# Patient Record
Sex: FEMALE | Race: WHITE | NOT HISPANIC OR LATINO | URBAN - METROPOLITAN AREA
[De-identification: names, ages, dates, MRNs, and addresses within clinical notes are randomized per-mention and may not be internally consistent; named-entity substitution may affect disease eponyms.]

---

## 2017-02-15 ENCOUNTER — OFFICE VISIT - RIVER FALLS (OUTPATIENT)
Dept: FAMILY MEDICINE | Facility: CLINIC | Age: 42
End: 2017-02-15

## 2017-02-15 ASSESSMENT — MIFFLIN-ST. JEOR: SCORE: 1230.09

## 2022-02-12 VITALS
HEART RATE: 80 BPM | HEIGHT: 65 IN | WEIGHT: 131 LBS | OXYGEN SATURATION: 99 % | BODY MASS INDEX: 21.83 KG/M2 | DIASTOLIC BLOOD PRESSURE: 68 MMHG | TEMPERATURE: 97.9 F | SYSTOLIC BLOOD PRESSURE: 118 MMHG

## 2022-02-15 NOTE — PROGRESS NOTES
Patient:   MAXIMINO PASCAL            MRN: [id number]            FIN: [id number]               Age:   41 years     Sex:  Female     :  1975   Associated Diagnoses:   Conjunctivitis; Other impetigo   Author:   Wilmar Bravo MD      Visit Information      Date of Service: 02/15/2017 03:25 pm  [facility]  Encounter#: [id number]      Chief Complaint   2/15/2017 3:29 PM CST    Pt presents today for itchy eye and skin irritation. Friday night she started to feel grainy feeling in her eyes. There is flakey skin in the crease of her right eye lid. Skin irritation has been present x 2 weeks. Dry cough x 2 days. H/O of pneumonia        History of Present Illness   Pt presents today for itchy pink eye irritation and bright red itchy skin irritation. Friday night she started to feel grainy feeling in her eyes. There is flakey skin in the crease of her right eye lid. Skin irritation has been present x 2 weeks. She was using her [employer] eye drops he had to treat his pink eye x 3 days. They seemed to help her skin irritation initially. Today her eyes do not feel right, they are itchy. She has a h/o rashes. The rash was initially flakey and white crusty then after the eye drops the flakiness fell off. Patient denies breaks in the skin prior to the rash appearing. Her right eye lid was irritated before she noticed the skin rash. She has deep painful acne located on her face that presented 2 weeks ago. Her eyes have been more watery recently. When she wakes in the AM her eye are not crusted shut. Denies any allergy issues or exposures. Denies recent eye drainage.    Dry cough x 2 days. H/O of pneumonia. She works for [employer] [place] [employer].      Review of Systems   Constitutional >> no fever, no chills  Eye >> no recent vision problems, no lcterus, no discharge, no blindness, no blurry or double vision  ENMT >> no decrease in hearing, no dysphasia, no ear pain, no sore throat, no  nasal congestion  Respiratory >> no SOB, dry cough, no sputum production, no wheezing, no labored respirations  Cardiovascular >> no chest or calf pain, no palpitations, no bradycardia or tachycardia, no edema  Gastrointestinal >> no nausea or vomiting, no diarrhea or constipation, no abdominal pain  Genitourinary >> negative  Hema/Lymph >> negative  Endocrine >> negative  Immunologic >> negative  Musculoskeletal >> no back pain, no neck pain, no joint pain, no muscle pain  Integumentary >> rash, dryness, deep firm nodule of right temple (epidermoid cyst), no abrasions, no pruritus, no petechiae  Neurologic >> patient is alert and orientated x 4, no headache, no loss of balance, no dizziness  Psychiatric >> no anxiety, no depression, no [place], no hallucinations, no suicidal ideation  All Other ROS >> ROS reviewed as documented in chart and HPI      Health Status   Allergies:    Allergic Reactions (Selected)  Severity Not Documented  Zithromax (Blisters)   Medications:  (Selected)   Prescriptions  Prescribed  cephalexin 500 mg oral capsule: 1 cap(s) ( 500 mg ), po, tid, # 21 cap(s), 0 Refill(s), Type: Maintenance, Pharmacy: [id number], 1 cap(s) po tid,x7 day(s)  sulfacetamide sodium 10% ophthalmic solution: 1 drop(s), both eyes, q6 hrs, # 15 mL, 0 Refill(s), Type: Maintenance, Pharmacy: [id number], 1 drop(s) both eyes q6 hrs,x7 day(s)  Documented Medications  Documented  Multiple Vitamins oral tablet: 1 tab(s), po, daily, 0 Refill(s), Type: Maintenance   Problem list:    All Problems  Recurrent cold sores / SNOMED CT 8139984639 / Confirmed  Seasonal Rhinitis / ICD-9-.9 / Confirmed      Histories   Past Medical History:    Active  Seasonal Rhinitis (477.9)  Resolved  PNA (pneumonia) (486):  Resolved in 2006 at 31 years.  Pregnancy (949038059):  Resolved on 9/24/2003 at 28 years.  Pregnancy (129707463):  Resolved on 6/13/2007 at 32 years.   Family History:    CA - Breast  cancer  Mother  Diabetes mellitus  Grandfather (M)  Heart disease  Sister  MI - Myocardial infarction  Father ()     Procedure history:    Spinal cord tumor (SNOMED CT [id number]) on 3/2/2016 at 40 Years.  Comments:  2016 12:40 PM - Leda Kohler  T12 and L1.  Mammogram (SNOMED CT 860090655) on 2015 at 40 Years.   Social History:        Alcohol Assessment            Current                     Comments:                      2010 - Parris Fisher CMA                     Occasionally.      Tobacco Assessment            Never      Substance Abuse Assessment            Never      Employment and Education Assessment            Employed                     Comments:                      2013 - [employer]                           Home and Environment Assessment            Marital status: .  Spouse/Partner name: Angel Navarro.      Nutrition and Health Assessment            Type of diet: Regular.      Exercise and Physical Activity Assessment            Exercise frequency: 1-2 times/week.  Exercise type: Walking.      Sexual Assessment            Sexually active: Yes.  Sexual orientation: Heterosexual.        Physical Examination   Vital Signs   2/15/2017 3:29 PM CST Temperature Tympanic 97.9 DegF    Peripheral Pulse Rate 80 bpm    HR Method Electronic    Systolic Blood Pressure 118 mmHg    Diastolic Blood Pressure 68 mmHg    Mean Arterial Pressure 85 mmHg    BP Site Right arm    BP Method Manual    Oxygen Saturation 99 %      Measurements from flowsheet : Measurements   2/15/2017 3:29 PM CST Height Measured 65 in    Weight Measured 131 lb    BSA 1.65 m2    Body Mass Index 21.8 kg/m2      General >> alert and orientated, no acute distress  Eye >> bilateral pink conjunctiva, crusting rash right upper lid and left lower lid  HEENT >> normal hearing  Neck >> supple, non-tender  Respiratory >> non-labored respirations, lung CTA, BS  equal  Cardiovascular >> normal rate, regular rhythm  Gastrointestinal >> negative  Genitourinary >> negative  Lymphatics >> negative  Musculoskeletal >> no body aches  Integumentary >> warm, dry, no rash  Neurologic >> alert, orientated  Psychiatric >> cooperative, appropriate mood and affect      Impression and Plan   Diagnosis     Conjunctivitis (DGW03-NQ H10.33).     Other impetigo (FCR83-JS L01.09).     Lesion on face could be a epidermoid cyst. Advised to leave lesion alone and watch it. If does not get better or if it becomes bothersome then follow up.  Break in skin and bacterial infection. Impetigo mainly caused by staph skin infection causing flakey skin.  Oral antibiotic - cephalexin 500 mg TID x 7 days discussed and prescribed for skin irritation.  Eyes are a little pink.  Sulfacetamide eye drops to apply TID x 5-7 days discussed and prescribed for eye irritation.  If impetigo lerner not resolve then consider eczema dx. Eczema is treated with very mild steroid topical.